# Patient Record
Sex: MALE | Race: BLACK OR AFRICAN AMERICAN | Employment: UNEMPLOYED | URBAN - METROPOLITAN AREA
[De-identification: names, ages, dates, MRNs, and addresses within clinical notes are randomized per-mention and may not be internally consistent; named-entity substitution may affect disease eponyms.]

---

## 2023-11-26 ENCOUNTER — HOSPITAL ENCOUNTER (EMERGENCY)
Facility: HOSPITAL | Age: 28
Discharge: HOME | End: 2023-11-26
Payer: OTHER GOVERNMENT

## 2023-11-26 ENCOUNTER — APPOINTMENT (EMERGENCY)
Dept: RADIOLOGY | Facility: HOSPITAL | Age: 28
End: 2023-11-26

## 2023-11-26 ENCOUNTER — HOSPITAL ENCOUNTER (EMERGENCY)
Facility: HOSPITAL | Age: 28
Discharge: HOME | End: 2023-11-26
Attending: EMERGENCY MEDICINE

## 2023-11-26 VITALS
OXYGEN SATURATION: 99 % | DIASTOLIC BLOOD PRESSURE: 76 MMHG | WEIGHT: 158.07 LBS | SYSTOLIC BLOOD PRESSURE: 160 MMHG | RESPIRATION RATE: 18 BRPM | HEART RATE: 110 BPM | TEMPERATURE: 97.8 F

## 2023-11-26 VITALS
SYSTOLIC BLOOD PRESSURE: 172 MMHG | TEMPERATURE: 99.5 F | DIASTOLIC BLOOD PRESSURE: 102 MMHG | OXYGEN SATURATION: 98 % | RESPIRATION RATE: 20 BRPM | WEIGHT: 146.39 LBS | HEART RATE: 126 BPM

## 2023-11-26 DIAGNOSIS — S60.229A HAND CONTUSION: ICD-10-CM

## 2023-11-26 DIAGNOSIS — S00.81XA FOREHEAD ABRASION: Primary | ICD-10-CM

## 2023-11-26 DIAGNOSIS — S20.419A ABRASION OF BACK: ICD-10-CM

## 2023-11-26 DIAGNOSIS — T65.891A TOXIC EFFECT OF PEPPER SPRAY: ICD-10-CM
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
## 2023-11-26 DIAGNOSIS — Z91.89 AT RISK FOR SELF-INFLICTED INJURY: ICD-10-CM

## 2023-11-26 PROCEDURE — 99284 EMERGENCY DEPT VISIT MOD MDM: CPT | Performed by: EMERGENCY MEDICINE

## 2023-11-26 PROCEDURE — 99282 EMERGENCY DEPT VISIT SF MDM: CPT

## 2023-11-26 PROCEDURE — 99283 EMERGENCY DEPT VISIT LOW MDM: CPT

## 2023-11-26 NOTE — ED NOTES
Remains agitated and combative with some staff at times.  Refused his tetanus shot "respectfully."     Kristofer Whittaker RN  11/26/23 0745
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
## 2023-11-26 NOTE — DISCHARGE INSTRUCTIONS
You've been medically cleared for incarceration. If you have concerns you can return to the ED at anytime.
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
## 2023-11-26 NOTE — ED NOTES
Pt uncooperative to assessment, refuses to answer questions.       Particia Darren  11/26/23 1872 [facility]  11/26/23 9511